# Patient Record
Sex: MALE | Race: WHITE | NOT HISPANIC OR LATINO | URBAN - METROPOLITAN AREA
[De-identification: names, ages, dates, MRNs, and addresses within clinical notes are randomized per-mention and may not be internally consistent; named-entity substitution may affect disease eponyms.]

---

## 2019-06-25 ENCOUNTER — EMERGENCY (EMERGENCY)
Facility: HOSPITAL | Age: 34
LOS: 1 days | Discharge: HOME | End: 2019-06-25
Attending: EMERGENCY MEDICINE | Admitting: EMERGENCY MEDICINE
Payer: SELF-PAY

## 2019-06-25 VITALS
TEMPERATURE: 98 F | RESPIRATION RATE: 14 BRPM | HEART RATE: 80 BPM | DIASTOLIC BLOOD PRESSURE: 87 MMHG | OXYGEN SATURATION: 100 % | SYSTOLIC BLOOD PRESSURE: 138 MMHG

## 2019-06-25 PROCEDURE — 99053 MED SERV 10PM-8AM 24 HR FAC: CPT

## 2019-06-25 PROCEDURE — 99283 EMERGENCY DEPT VISIT LOW MDM: CPT | Mod: 25

## 2019-06-25 NOTE — ED ADULT TRIAGE NOTE - CHIEF COMPLAINT QUOTE
c/o RLQ pain since yesterday. denies n/v/d, urinary complaints. denies medical hx. states had relief with motrin.

## 2019-06-26 RX ORDER — TAMSULOSIN HYDROCHLORIDE 0.4 MG/1
1 CAPSULE ORAL
Qty: 5 | Refills: 0
Start: 2019-06-26 | End: 2019-06-30

## 2019-06-26 RX ORDER — TAMSULOSIN HYDROCHLORIDE 0.4 MG/1
0.4 CAPSULE ORAL ONCE
Refills: 0 | Status: COMPLETED | OUTPATIENT
Start: 2019-06-26 | End: 2019-06-26

## 2019-06-26 RX ADMIN — TAMSULOSIN HYDROCHLORIDE 0.4 MILLIGRAM(S): 0.4 CAPSULE ORAL at 00:10

## 2019-06-26 NOTE — ED ADULT NURSE NOTE - OBJECTIVE STATEMENT
Pt is a 34 year old male reporting to the ED for RLQ pain. Pt reports pain started yesterday and has been constant. Pt denies dysuria, fever, chills, n/v/d. Pt appears to be in NAd, comfortable. Pt denies chest pain or SOB. Pt rr even and unlabored. Pt received medication as ordered, will continue to monitor.

## 2019-06-26 NOTE — ED PROVIDER NOTE - NSFOLLOWUPINSTRUCTIONS_ED_ALL_ED_FT
You have been seen for abdominal pain.  Your urine has blood in it but no sign of infection, which is suspicious for a kidney stone.  The stone may have passed, as you are no longer having pain.  If the pain returns, please take motrin 600mg every 8 hours as needed.  Also take flomax one tablet daily for the next 5 days to help any remnant of the stone to pass.    Please come back to the ED if you develop fever, vomiting, diarrhea, worsening pain, or other concerns.

## 2019-06-26 NOTE — ED PROVIDER NOTE - OBJECTIVE STATEMENT
cabot: 34M with no PMH who comes in with 2d of intermittent RLQ pain, 2/10.  No F/C/N/V/D/urinary sx.  No prior episodes.  Pain is now resolved.

## 2019-06-26 NOTE — ED PROVIDER NOTE - CLINICAL SUMMARY MEDICAL DECISION MAKING FREE TEXT BOX
cabot: 34M with no PMH who comes in with 2d of intermittent RLQ pain, 2/10.  No F/C/N/V/D/urinary sx.  No prior episodes.  Pain is now resolved.  Exam normal.  UA with moderate blood, no LE or nitrites. Most likely ureteral stone.  Exam and history not consistent with appy.  Will discharge with flomax and return precautions.